# Patient Record
Sex: FEMALE | Race: WHITE | Employment: PART TIME | URBAN - NONMETROPOLITAN AREA
[De-identification: names, ages, dates, MRNs, and addresses within clinical notes are randomized per-mention and may not be internally consistent; named-entity substitution may affect disease eponyms.]

---
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
## 2021-02-22 NOTE — TELEPHONE ENCOUNTER
pts mom called, she is worried about pt. States she has moved out and living with boyfriend and his parents. She is very concerned about her well being. States she tried to commit suicide this weekend after a wedding. She also states she is seeing Maribel Barros and pt told mom that she told Maribel Barros what was going on and she told her to just give her medication a little longer to work. Mom doesn't think that is what Maribel Barros said. Her next appt here isn't until 11/27/17. Consent: Written consent was obtained and risks were reviewed including but not limited to scarring, infection, bleeding, scabbing, incomplete removal, nerve damage and allergy to anesthesia.